# Patient Record
Sex: FEMALE | Race: WHITE | NOT HISPANIC OR LATINO | Employment: OTHER | URBAN - METROPOLITAN AREA
[De-identification: names, ages, dates, MRNs, and addresses within clinical notes are randomized per-mention and may not be internally consistent; named-entity substitution may affect disease eponyms.]

---

## 2017-09-29 ENCOUNTER — ALLIED HEALTH/NURSE VISIT (OUTPATIENT)
Dept: NURSING | Facility: CLINIC | Age: 72
End: 2017-09-29
Payer: COMMERCIAL

## 2017-09-29 DIAGNOSIS — Z23 NEED FOR PROPHYLACTIC VACCINATION AND INOCULATION AGAINST INFLUENZA: Primary | ICD-10-CM

## 2017-09-29 PROCEDURE — G0008 ADMIN INFLUENZA VIRUS VAC: HCPCS

## 2017-09-29 PROCEDURE — 90662 IIV NO PRSV INCREASED AG IM: CPT

## 2017-09-29 NOTE — MR AVS SNAPSHOT
After Visit Summary   9/29/2017    Chloe Bo    MRN: [id number]           Patient Information     Date Of Birth          1945        Visit Information        Provider Department      9/29/2017 2:30 PM CORETTA REDDY/LPN [facility] [place]        Today's Diagnoses     Need for prophylactic vaccination and inoculation against influenza    -  1       Follow-ups after your visit        Who to contact     If you have questions or need follow up information about today's clinic visit or your schedule please contact [facility] [place] directly at [phone].  Normal or non-critical lab and imaging results will be communicated to you by [employer], letter or phone within 4 business days after the clinic has received the results. If you do not hear from us within 7 days, please contact the clinic through [employer] or phone. If you have a critical or abnormal lab result, we will notify you by phone as soon as possible.  Submit refill requests through [employer] or call your pharmacy and they will forward the refill request to us. Please allow 3 business days for your refill to be completed.          Additional Information About Your Visit        MyChart Information     [employer] gives you secure access to your electronic health record. If you see a primary care provider, you can also send messages to your care team and make appointments. If you have questions, please call your primary care clinic.  If you do not have a primary care provider, please call [phone] and they will assist you.        Care EveryWhere ID     This is your Care EveryWhere ID. This could be used by other organizations to access your [facility] medical records  [id number]         Blood Pressure from Last 3 Encounters:   05/22/16 163/66   06/02/14 136/75   05/01/14 126/78    Weight from Last 3 Encounters:   05/22/16 230 lb (104.3 kg)   06/02/14 240 lb (108.9 kg)   05/01/14 238 lb (108 kg)              We Performed the  Following     FLU VACCINE, INCREASED ANTIGEN, PRESV FREE, AGE 65+ [74456]     Vaccine Administration, Initial [44886]        Primary Care Provider Office Phone # Fax #    Dank Riojas [phone]       [facility] [address] [place] [address]        Equal Access to Services     [facility]: Hadii aad ku hadasho Soomaali, waaxda luqadaha, qaybta kaalmada adeegyada, waxay idiin hayaan adeeg kharash la'aan . So [facility] [phone].    ATENCIÓN: Si habla español, tiene a tavera disposición servicios gratuitos de asistencia lingüística. [facility] [phone].    We comply with applicable federal civil rights laws and [place] laws. We do not discriminate on the basis of race, color, national origin, age, disability, sex, sexual orientation, or gender identity.            Thank you!     Thank you for choosing [facility]  for your care. Our goal is always to provide you with excellent care. Hearing back from our patients is one way we can continue to improve our services. Please take a few minutes to complete the written survey that you may receive in the mail after your visit with us. Thank you!             Your Updated Medication List - Protect others around you: Learn how to safely use, store and throw away your medicines at www.disposemymeds.org.          This list is accurate as of: 9/29/17  2:34 PM.  Always use your most recent med list.                   Brand Name Dispense Instructions for use Diagnosis    acetaminophen 500 MG Caps      Two tabs at AM, three tabs at 5 PM, and two at bedtime        ADVAIR DISKUS 100-50 MCG/DOSE diskus inhaler   Generic drug:  fluticasone-salmeterol      Inhale 1 puff into the lungs daily        amitriptyline 25 MG tablet    ELAVIL    90 tablet    Take 1 tablet (25 mg) by mouth At Bedtime    Tension headache, Type 2 diabetes, HbA1c goal < 7% (H), Diabetic neuropathy (H)       ascorbic acid 1000 MG Tabs tablet      Take  by mouth.         aspirin 81 MG tablet      Take 1 tablet by mouth daily.        benazepril 10 MG tablet    LOTENSIN    90 tablet    Take 1 tablet (10 mg) by mouth daily    HTN, goal below 140/90       blood glucose monitoring test strip    ONE TOUCH ULTRA    50 strip    Use to test blood sugars 1-2 times daily or as directed.    Type 2 diabetes, HbA1c goal < 7% (H), Hyperlipidemia LDL goal <100       CALCIUM + D PO      Take  by mouth.        CHROMIUM PICOLINATE PO           DAILY MULTIVITAMIN PO      Take  by mouth.        OCUVITE-LUTEIN PO           DIGESTIVE ENZYMES PO      Take  by mouth.        FISH OIL           FLUTICASONE PROPIONATE (NASAL) NA           glipiZIDE 5 MG 24 hr tablet    GLUCOTROL XL    360 tablet    Take 2 tablets (10 mg) by mouth 2 times daily (with meals)    Type 2 diabetes, HbA1c goal < 7% (H)       GLUCOSAMINE-CHONDROITIN PO           HYDROcodone-acetaminophen 5-325 MG per tablet    NORCO    10 tablet    Take 1 tablet by mouth nightly as needed for other (cough)        KELP PO           ketoconazole 2 % cream    NIZORAL    15 g    Apply topically 2 times daily    Perleche       metFORMIN 500 MG 24 hr tablet    GLUCOPHAGE-XR    360 tablet    Take 2 tablets (1,000 mg) by mouth 2 times daily (with meals)    Type 2 diabetes, HbA1c goal < 7% (H)       metoprolol 25 MG tablet    LOPRESSOR    180 tablet    Take 1 tablet (25 mg) by mouth 2 times daily    Essential tremor       mupirocin 2 % ointment    BACTROBAN    22 g    Apply  topically 3 times daily.    Perleche       NAPHCON-A Soln   Generic drug:  naphazoline-pheniramine      1 drop 2 times daily        naproxen 500 MG tablet    NAPROSYN    180 tablet    Take 1 tablet (500 mg) by mouth 2 times daily as needed Take medication with food    Osteoarthritis       omeprazole 40 MG capsule    priLOSEC    90 capsule    Take 1 capsule (40 mg) by mouth daily    GERD (gastroesophageal reflux disease)       oxybutynin 5 MG tablet    DITROPAN    270 tablet    Take  1 tablet (5 mg) by mouth 3 times daily    Urge incontinence       phenazopyridine 200 MG tablet    PYRIDIUM    9 tablet    Take 1 tablet (200 mg) by mouth 3 times daily as needed for pain Expect your urine to appear bright orange    UTI (urinary tract infection)       PROAIR  (90 BASE) MCG/ACT Inhaler   Generic drug:  albuterol      Inhale 2 puffs into the lungs every 6 hours as needed.        STATIN NOT PRESCRIBED (INTENTIONAL)     0 each    Statin not prescribed intentionally due to Refusal by patient    Health care home, active care coordination       VITAMIN B-1 PO      Take  by mouth.        VITAMIN D PO      Take  by mouth.        ZYRTEC ALLERGY 10 MG tablet   Generic drug:  cetirizine      Take 10 mg by mouth daily.

## 2017-09-29 NOTE — PROGRESS NOTES
Injectable Influenza Immunization Documentation    1.  Is the person to be vaccinated sick today?   No    2. Does the person to be vaccinated have an allergy to a component   of the vaccine?   No    3. Has the person to be vaccinated ever had a serious reaction   to influenza vaccine in the past?   No    4. Has the person to be vaccinated ever had Guillain-Barré syndrome?   No    Form completed by Shelly Krause, [facility] [address]

## 2022-05-06 ENCOUNTER — HOSPITAL ENCOUNTER (EMERGENCY)
Facility: CLINIC | Age: 77
Discharge: HOME | End: 2022-05-06
Payer: COMMERCIAL

## 2022-05-06 VITALS
BODY MASS INDEX: 36.32 KG/M2 | RESPIRATION RATE: 18 BRPM | HEIGHT: 60 IN | HEART RATE: 64 BPM | WEIGHT: 185 LBS | TEMPERATURE: 98.2 F | OXYGEN SATURATION: 96 % | SYSTOLIC BLOOD PRESSURE: 134 MMHG | DIASTOLIC BLOOD PRESSURE: 53 MMHG

## 2022-05-06 NOTE — ED NOTES
Patient stated intent to leave without being seen after [facility].  Patient informed that though they received the results, they still need to be seen by a provider, since they are preliminary results, and the provider may need to order more tests to better evaluate the patient's medical complaint. Patient stated they understood there are risks of leaving prior to being seen.

## 2022-05-06 NOTE — ED TRIAGE NOTES
"Pt has occasional right eye \"blackouts\" since the beginning of the year, was at eye doctor today and they sent her here for imaging.      Triage Assessment     Row Name 05/06/22 1128       Triage Assessment (Adult)    Airway WDL WDL       Respiratory WDL    Respiratory WDL WDL       Skin Circulation/Temperature WDL    Skin Circulation/Temperature WDL WDL       Cardiac WDL    Cardiac WDL WDL       Cognitive/Neuro/Behavioral WDL    Cognitive/Neuro/Behavioral WDL WDL              "